# Patient Record
Sex: MALE | Race: WHITE
[De-identification: names, ages, dates, MRNs, and addresses within clinical notes are randomized per-mention and may not be internally consistent; named-entity substitution may affect disease eponyms.]

---

## 2020-02-10 ENCOUNTER — HOSPITAL ENCOUNTER (EMERGENCY)
Dept: HOSPITAL 26 - MED | Age: 20
LOS: 1 days | Discharge: HOME | End: 2020-02-11
Payer: COMMERCIAL

## 2020-02-10 VITALS — BODY MASS INDEX: 20.19 KG/M2 | WEIGHT: 141 LBS | HEIGHT: 70 IN

## 2020-02-10 DIAGNOSIS — J10.1: Primary | ICD-10-CM

## 2020-02-10 PROCEDURE — 87804 INFLUENZA ASSAY W/OPTIC: CPT

## 2020-02-10 PROCEDURE — 99284 EMERGENCY DEPT VISIT MOD MDM: CPT

## 2020-02-10 PROCEDURE — 71045 X-RAY EXAM CHEST 1 VIEW: CPT

## 2020-02-11 VITALS — SYSTOLIC BLOOD PRESSURE: 110 MMHG | DIASTOLIC BLOOD PRESSURE: 66 MMHG

## 2020-02-11 VITALS — SYSTOLIC BLOOD PRESSURE: 148 MMHG | DIASTOLIC BLOOD PRESSURE: 75 MMHG

## 2020-02-11 NOTE — NUR
20 YO M BIB SELF C/O FLU S/SX X 4 DAYS WITH 6/10 BODY ACHES, CHILLS AND COUGH. 
DENIES NVD. AFEBRILE AT THIS TIME, REPORTS INTERMITTENT SUBJECTIVE FEVER. NO 
COUGH HEARD AT THIS TIME. LUNGS CTA. MEDICATED AT HOME WITH TYLENOL WITH 
TEMPORARY RELIEF. 



[facility]-- DENIES

RX-- TYLENOL @ 1400

## 2020-02-11 NOTE — NUR
Patient discharged with v/s stable. Written and verbal after care instructions 
given and explained. 

Patient alert, oriented and verbalized understanding of instructions. 
Ambulatory with steady gait. All questions addressed prior to discharge. ID 
band removed. Patient advised to follow up with PMD. Rx of PROMETHAZINE, MOTRIN 
given. Patient educated on indication of medication including possible reaction 
and side effects. Opportunity to ask questions provided and answered.

## 2020-02-12 NOTE — NUR
-------------------------------------------------------------------------------

            *** Note undone in EDM - 02/12/20 at 0646 by [facility] ***             

-------------------------------------------------------------------------------

18 YO M BIB SELF C/O FLU S/SX X 4 DAYS WITH 6/10 BODY ACHES, CHILLS AND COUGH. 
DENIES NVD. AFEBRILE AT THIS TIME, REPORTS INTERMITTENT SUBJECTIVE FEVER. NO 
COUGH HEARD AT THIS TIME. LUNGS CTA. MEDICATED AT HOME WITH TYLENOL WITH 
TEMPORARY RELIEF. 



[facility]-- DENIES

RX-- TYLENOL @ 1400